# Patient Record
Sex: MALE | URBAN - METROPOLITAN AREA
[De-identification: names, ages, dates, MRNs, and addresses within clinical notes are randomized per-mention and may not be internally consistent; named-entity substitution may affect disease eponyms.]

---

## 2024-01-24 ENCOUNTER — APPOINTMENT (RX ONLY)
Dept: URBAN - METROPOLITAN AREA CLINIC 236 | Facility: CLINIC | Age: 49
Setting detail: DERMATOLOGY
End: 2024-01-24

## 2024-01-24 DIAGNOSIS — Z41.9 ENCOUNTER FOR PROCEDURE FOR PURPOSES OTHER THAN REMEDYING HEALTH STATE, UNSPECIFIED: ICD-10-CM

## 2024-01-24 PROCEDURE — ? DYSPORT

## 2024-01-24 NOTE — PROCEDURE: DYSPORT
Additional Area 5 Units: 0
Additional Area 1 Location: [facility]
Show Additional Area 3: Yes
Glabellar Complex Units: 60
Consent: Written consent obtained. Risks include but not limited to lid/brow ptosis, bruising, swelling, diplopia, temporary effect, incomplete chemical denervation.
Show Ucl Units: No
Additional Area 2 Location: obicularis oris
Detail Level: Detailed
Post-Care Instructions: Patient instructed to not lie down for 4 hours and limit physical activity for 24 hours.
Lot #: [id number]
Additional Area 3 Location: nasalis
Dilution (U/0.1 Cc): 2.5
Expiration Date (Month Year): 7/31/2025
Additional Area 4 Location: [facility]eters
Price (Use Numbers Only, No Special Characters Or $): 020

## 2024-04-24 ENCOUNTER — APPOINTMENT (RX ONLY)
Dept: URBAN - METROPOLITAN AREA CLINIC 236 | Facility: CLINIC | Age: 49
Setting detail: DERMATOLOGY
End: 2024-04-24

## 2024-04-24 DIAGNOSIS — Z41.9 ENCOUNTER FOR PROCEDURE FOR PURPOSES OTHER THAN REMEDYING HEALTH STATE, UNSPECIFIED: ICD-10-CM

## 2024-04-24 PROCEDURE — ? DYSPORT

## 2024-04-24 NOTE — PROCEDURE: DYSPORT
Additional Area 1 Units: 0
Show Levator Superior Units: Yes
Show Ucl Units: No
Additional Area 2 Location: obicularis oris
Post-Care Instructions: Patient instructed to not lie down for 4 hours and limit physical activity for 24 hours.
Forehead Units: 48
Lot #: [id number]
Dilution (U/0.1 Cc): 2.5
Additional Area 3 Location: nasalis
Additional Area 4 Location: masseters
Detail Level: Detailed
Expiration Date (Month Year): 11/30/25
Price (Use Numbers Only, No Special Characters Or $): 091
Periorbital Skin Units: 60
Consent: Written consent obtained. Risks include but not limited to lid/brow ptosis, bruising, swelling, diplopia, temporary effect, incomplete chemical denervation.
Additional Area 1 Location: [facility] [place]